# Patient Record
Sex: MALE | Race: WHITE | NOT HISPANIC OR LATINO | URBAN - METROPOLITAN AREA
[De-identification: names, ages, dates, MRNs, and addresses within clinical notes are randomized per-mention and may not be internally consistent; named-entity substitution may affect disease eponyms.]

---

## 2017-08-07 ENCOUNTER — OFFICE (OUTPATIENT)
Dept: URBAN - METROPOLITAN AREA CLINIC 11 | Facility: CLINIC | Age: 57
End: 2017-08-07

## 2017-08-07 VITALS
HEIGHT: 67 IN | WEIGHT: 209 LBS | SYSTOLIC BLOOD PRESSURE: 160 MMHG | DIASTOLIC BLOOD PRESSURE: 83 MMHG | HEART RATE: 84 BPM

## 2017-08-07 DIAGNOSIS — K92.1 HEMATOCHEZIA: ICD-10-CM

## 2017-08-07 DIAGNOSIS — Z86.010 PERSONAL HISTORY COLON POLYPS: ICD-10-CM

## 2017-08-07 PROCEDURE — 99244 OFF/OP CNSLTJ NEW/EST MOD 40: CPT | Performed by: INTERNAL MEDICINE

## 2017-08-07 NOTE — SERVICEHPINOTES
This is a   57   year old  male   seen   in consultation at the request of Dr. RAY PARRA  . Patient with last colonoscopy 2013 with a serrated adenoma. Noted bout of rectal bleeding 6 to 8 weeks ago admits to some straining with BM.  Rectal bleeding resolved after two to three days. No rectal pain or wt loss.  No abdominal pain.  NO nausea or vomiting.

## 2017-09-12 ENCOUNTER — AMBULATORY SURGICAL CENTER (OUTPATIENT)
Dept: URBAN - METROPOLITAN AREA SURGERY 8 | Facility: SURGERY | Age: 57
End: 2017-09-12

## 2017-09-12 VITALS
WEIGHT: 200 LBS | RESPIRATION RATE: 8 BRPM | OXYGEN SATURATION: 96 % | TEMPERATURE: 97.5 F | HEIGHT: 67 IN | HEART RATE: 83 BPM | DIASTOLIC BLOOD PRESSURE: 82 MMHG | SYSTOLIC BLOOD PRESSURE: 147 MMHG

## 2017-09-12 DIAGNOSIS — K92.1 MELENA: ICD-10-CM

## 2017-09-12 DIAGNOSIS — K64.8 OTHER HEMORRHOIDS: ICD-10-CM

## 2017-09-12 PROCEDURE — 45378 DIAGNOSTIC COLONOSCOPY: CPT | Performed by: INTERNAL MEDICINE

## 2017-09-12 PROCEDURE — 99152 MOD SED SAME PHYS/QHP 5/>YRS: CPT | Performed by: INTERNAL MEDICINE

## 2017-09-12 PROCEDURE — 99153 MOD SED SAME PHYS/QHP EA: CPT | Performed by: INTERNAL MEDICINE

## 2024-03-25 ENCOUNTER — OFFICE VISIT (OUTPATIENT)
Dept: URBAN - METROPOLITAN AREA CLINIC 91 | Facility: CLINIC | Age: 64
End: 2024-03-25
Payer: COMMERCIAL

## 2024-03-25 DIAGNOSIS — H25.093 OTHER AGE-RELATED INCIPIENT CATARACT, BILATERAL: ICD-10-CM

## 2024-03-25 DIAGNOSIS — H52.223 REGULAR ASTIGMATISM, BILATERAL: ICD-10-CM

## 2024-03-25 DIAGNOSIS — E11.9 DIABETES MELLITUS TYPE 2 WITHOUT MENTION OF COMPLICATION: Primary | ICD-10-CM

## 2024-03-25 PROCEDURE — 99204 OFFICE O/P NEW MOD 45 MIN: CPT | Performed by: SPECIALIST

## 2024-03-25 ASSESSMENT — INTRAOCULAR PRESSURE
OS: 18
OD: 18

## 2024-03-25 ASSESSMENT — VISUAL ACUITY
OD: 20/20
OS: 20/20

## 2025-04-29 ENCOUNTER — OFFICE VISIT (OUTPATIENT)
Facility: LOCATION | Age: 65
End: 2025-04-29
Payer: COMMERCIAL

## 2025-04-29 DIAGNOSIS — H25.093 OTHER AGE-RELATED INCIPIENT CATARACT, BILATERAL: Primary | ICD-10-CM

## 2025-04-29 DIAGNOSIS — E11.9 DIABETES MELLITUS TYPE 2 WITHOUT MENTION OF COMPLICATION: ICD-10-CM

## 2025-04-29 PROCEDURE — 99214 OFFICE O/P EST MOD 30 MIN: CPT | Performed by: SPECIALIST

## 2025-04-29 ASSESSMENT — VISUAL ACUITY
OS: 20/20
OD: 20/20

## 2025-04-29 ASSESSMENT — INTRAOCULAR PRESSURE
OD: 15
OS: 15